# Patient Record
Sex: FEMALE | Race: WHITE
[De-identification: names, ages, dates, MRNs, and addresses within clinical notes are randomized per-mention and may not be internally consistent; named-entity substitution may affect disease eponyms.]

---

## 2020-08-03 ENCOUNTER — HOSPITAL ENCOUNTER (INPATIENT)
Dept: HOSPITAL 75 - LDRP | Age: 25
LOS: 1 days | Discharge: HOME | End: 2020-08-04
Attending: OBSTETRICS & GYNECOLOGY | Admitting: OBSTETRICS & GYNECOLOGY
Payer: MEDICAID

## 2020-08-03 VITALS — DIASTOLIC BLOOD PRESSURE: 68 MMHG | SYSTOLIC BLOOD PRESSURE: 116 MMHG

## 2020-08-03 VITALS — DIASTOLIC BLOOD PRESSURE: 57 MMHG | SYSTOLIC BLOOD PRESSURE: 105 MMHG

## 2020-08-03 VITALS — SYSTOLIC BLOOD PRESSURE: 95 MMHG | DIASTOLIC BLOOD PRESSURE: 50 MMHG

## 2020-08-03 VITALS — DIASTOLIC BLOOD PRESSURE: 68 MMHG | SYSTOLIC BLOOD PRESSURE: 104 MMHG

## 2020-08-03 VITALS — SYSTOLIC BLOOD PRESSURE: 101 MMHG | DIASTOLIC BLOOD PRESSURE: 66 MMHG

## 2020-08-03 VITALS — SYSTOLIC BLOOD PRESSURE: 91 MMHG | DIASTOLIC BLOOD PRESSURE: 54 MMHG

## 2020-08-03 VITALS — DIASTOLIC BLOOD PRESSURE: 74 MMHG | SYSTOLIC BLOOD PRESSURE: 112 MMHG

## 2020-08-03 VITALS — DIASTOLIC BLOOD PRESSURE: 58 MMHG | SYSTOLIC BLOOD PRESSURE: 104 MMHG

## 2020-08-03 VITALS — SYSTOLIC BLOOD PRESSURE: 87 MMHG | DIASTOLIC BLOOD PRESSURE: 51 MMHG

## 2020-08-03 VITALS — SYSTOLIC BLOOD PRESSURE: 114 MMHG | DIASTOLIC BLOOD PRESSURE: 83 MMHG

## 2020-08-03 VITALS — DIASTOLIC BLOOD PRESSURE: 60 MMHG | SYSTOLIC BLOOD PRESSURE: 102 MMHG

## 2020-08-03 VITALS — DIASTOLIC BLOOD PRESSURE: 75 MMHG | SYSTOLIC BLOOD PRESSURE: 111 MMHG

## 2020-08-03 VITALS — DIASTOLIC BLOOD PRESSURE: 55 MMHG | SYSTOLIC BLOOD PRESSURE: 110 MMHG

## 2020-08-03 VITALS — DIASTOLIC BLOOD PRESSURE: 74 MMHG | SYSTOLIC BLOOD PRESSURE: 108 MMHG

## 2020-08-03 VITALS — SYSTOLIC BLOOD PRESSURE: 90 MMHG | DIASTOLIC BLOOD PRESSURE: 52 MMHG

## 2020-08-03 VITALS — SYSTOLIC BLOOD PRESSURE: 102 MMHG | DIASTOLIC BLOOD PRESSURE: 62 MMHG

## 2020-08-03 VITALS — DIASTOLIC BLOOD PRESSURE: 70 MMHG | SYSTOLIC BLOOD PRESSURE: 102 MMHG

## 2020-08-03 VITALS — SYSTOLIC BLOOD PRESSURE: 104 MMHG | DIASTOLIC BLOOD PRESSURE: 63 MMHG

## 2020-08-03 VITALS — HEIGHT: 64.02 IN | BODY MASS INDEX: 36.66 KG/M2 | WEIGHT: 214.73 LBS

## 2020-08-03 VITALS — SYSTOLIC BLOOD PRESSURE: 98 MMHG | DIASTOLIC BLOOD PRESSURE: 61 MMHG

## 2020-08-03 VITALS — DIASTOLIC BLOOD PRESSURE: 75 MMHG | SYSTOLIC BLOOD PRESSURE: 117 MMHG

## 2020-08-03 VITALS — SYSTOLIC BLOOD PRESSURE: 87 MMHG | DIASTOLIC BLOOD PRESSURE: 58 MMHG

## 2020-08-03 VITALS — SYSTOLIC BLOOD PRESSURE: 72 MMHG | DIASTOLIC BLOOD PRESSURE: 44 MMHG

## 2020-08-03 VITALS — DIASTOLIC BLOOD PRESSURE: 56 MMHG | SYSTOLIC BLOOD PRESSURE: 103 MMHG

## 2020-08-03 VITALS — SYSTOLIC BLOOD PRESSURE: 95 MMHG | DIASTOLIC BLOOD PRESSURE: 62 MMHG

## 2020-08-03 VITALS — DIASTOLIC BLOOD PRESSURE: 51 MMHG | SYSTOLIC BLOOD PRESSURE: 84 MMHG

## 2020-08-03 VITALS — DIASTOLIC BLOOD PRESSURE: 54 MMHG | SYSTOLIC BLOOD PRESSURE: 96 MMHG

## 2020-08-03 VITALS — SYSTOLIC BLOOD PRESSURE: 98 MMHG | DIASTOLIC BLOOD PRESSURE: 53 MMHG

## 2020-08-03 VITALS — DIASTOLIC BLOOD PRESSURE: 72 MMHG | SYSTOLIC BLOOD PRESSURE: 109 MMHG

## 2020-08-03 VITALS — SYSTOLIC BLOOD PRESSURE: 85 MMHG | DIASTOLIC BLOOD PRESSURE: 53 MMHG

## 2020-08-03 VITALS — SYSTOLIC BLOOD PRESSURE: 106 MMHG | DIASTOLIC BLOOD PRESSURE: 74 MMHG

## 2020-08-03 VITALS — DIASTOLIC BLOOD PRESSURE: 63 MMHG | SYSTOLIC BLOOD PRESSURE: 104 MMHG

## 2020-08-03 VITALS — DIASTOLIC BLOOD PRESSURE: 74 MMHG | SYSTOLIC BLOOD PRESSURE: 111 MMHG

## 2020-08-03 VITALS — SYSTOLIC BLOOD PRESSURE: 115 MMHG | DIASTOLIC BLOOD PRESSURE: 73 MMHG

## 2020-08-03 VITALS — SYSTOLIC BLOOD PRESSURE: 113 MMHG | DIASTOLIC BLOOD PRESSURE: 59 MMHG

## 2020-08-03 VITALS — DIASTOLIC BLOOD PRESSURE: 71 MMHG | SYSTOLIC BLOOD PRESSURE: 106 MMHG

## 2020-08-03 VITALS — SYSTOLIC BLOOD PRESSURE: 102 MMHG | DIASTOLIC BLOOD PRESSURE: 63 MMHG

## 2020-08-03 VITALS — DIASTOLIC BLOOD PRESSURE: 69 MMHG | SYSTOLIC BLOOD PRESSURE: 130 MMHG

## 2020-08-03 VITALS — SYSTOLIC BLOOD PRESSURE: 99 MMHG | DIASTOLIC BLOOD PRESSURE: 54 MMHG

## 2020-08-03 VITALS — DIASTOLIC BLOOD PRESSURE: 56 MMHG | SYSTOLIC BLOOD PRESSURE: 109 MMHG

## 2020-08-03 VITALS — DIASTOLIC BLOOD PRESSURE: 54 MMHG | SYSTOLIC BLOOD PRESSURE: 87 MMHG

## 2020-08-03 VITALS — SYSTOLIC BLOOD PRESSURE: 114 MMHG | DIASTOLIC BLOOD PRESSURE: 78 MMHG

## 2020-08-03 VITALS — SYSTOLIC BLOOD PRESSURE: 106 MMHG | DIASTOLIC BLOOD PRESSURE: 63 MMHG

## 2020-08-03 VITALS — DIASTOLIC BLOOD PRESSURE: 57 MMHG | SYSTOLIC BLOOD PRESSURE: 96 MMHG

## 2020-08-03 VITALS — DIASTOLIC BLOOD PRESSURE: 64 MMHG | SYSTOLIC BLOOD PRESSURE: 103 MMHG

## 2020-08-03 VITALS — DIASTOLIC BLOOD PRESSURE: 64 MMHG | SYSTOLIC BLOOD PRESSURE: 105 MMHG

## 2020-08-03 VITALS — DIASTOLIC BLOOD PRESSURE: 71 MMHG | SYSTOLIC BLOOD PRESSURE: 110 MMHG

## 2020-08-03 VITALS — DIASTOLIC BLOOD PRESSURE: 50 MMHG | SYSTOLIC BLOOD PRESSURE: 90 MMHG

## 2020-08-03 VITALS — DIASTOLIC BLOOD PRESSURE: 67 MMHG | SYSTOLIC BLOOD PRESSURE: 170 MMHG

## 2020-08-03 VITALS — DIASTOLIC BLOOD PRESSURE: 72 MMHG | SYSTOLIC BLOOD PRESSURE: 108 MMHG

## 2020-08-03 VITALS — SYSTOLIC BLOOD PRESSURE: 110 MMHG | DIASTOLIC BLOOD PRESSURE: 65 MMHG

## 2020-08-03 VITALS — SYSTOLIC BLOOD PRESSURE: 111 MMHG | DIASTOLIC BLOOD PRESSURE: 64 MMHG

## 2020-08-03 VITALS — DIASTOLIC BLOOD PRESSURE: 63 MMHG | SYSTOLIC BLOOD PRESSURE: 144 MMHG

## 2020-08-03 VITALS — SYSTOLIC BLOOD PRESSURE: 99 MMHG | DIASTOLIC BLOOD PRESSURE: 65 MMHG

## 2020-08-03 VITALS — DIASTOLIC BLOOD PRESSURE: 57 MMHG | SYSTOLIC BLOOD PRESSURE: 99 MMHG

## 2020-08-03 VITALS — DIASTOLIC BLOOD PRESSURE: 56 MMHG | SYSTOLIC BLOOD PRESSURE: 102 MMHG

## 2020-08-03 VITALS — DIASTOLIC BLOOD PRESSURE: 75 MMHG | SYSTOLIC BLOOD PRESSURE: 115 MMHG

## 2020-08-03 VITALS — SYSTOLIC BLOOD PRESSURE: 111 MMHG | DIASTOLIC BLOOD PRESSURE: 74 MMHG

## 2020-08-03 VITALS — DIASTOLIC BLOOD PRESSURE: 57 MMHG | SYSTOLIC BLOOD PRESSURE: 101 MMHG

## 2020-08-03 VITALS — SYSTOLIC BLOOD PRESSURE: 90 MMHG | DIASTOLIC BLOOD PRESSURE: 54 MMHG

## 2020-08-03 VITALS — DIASTOLIC BLOOD PRESSURE: 62 MMHG | SYSTOLIC BLOOD PRESSURE: 104 MMHG

## 2020-08-03 VITALS — SYSTOLIC BLOOD PRESSURE: 109 MMHG | DIASTOLIC BLOOD PRESSURE: 69 MMHG

## 2020-08-03 VITALS — DIASTOLIC BLOOD PRESSURE: 51 MMHG | SYSTOLIC BLOOD PRESSURE: 89 MMHG

## 2020-08-03 VITALS — SYSTOLIC BLOOD PRESSURE: 113 MMHG | DIASTOLIC BLOOD PRESSURE: 73 MMHG

## 2020-08-03 VITALS — DIASTOLIC BLOOD PRESSURE: 53 MMHG | SYSTOLIC BLOOD PRESSURE: 110 MMHG

## 2020-08-03 VITALS — SYSTOLIC BLOOD PRESSURE: 108 MMHG | DIASTOLIC BLOOD PRESSURE: 70 MMHG

## 2020-08-03 VITALS — SYSTOLIC BLOOD PRESSURE: 108 MMHG | DIASTOLIC BLOOD PRESSURE: 67 MMHG

## 2020-08-03 VITALS — SYSTOLIC BLOOD PRESSURE: 101 MMHG | DIASTOLIC BLOOD PRESSURE: 71 MMHG

## 2020-08-03 VITALS — DIASTOLIC BLOOD PRESSURE: 66 MMHG | SYSTOLIC BLOOD PRESSURE: 108 MMHG

## 2020-08-03 VITALS — SYSTOLIC BLOOD PRESSURE: 102 MMHG | DIASTOLIC BLOOD PRESSURE: 65 MMHG

## 2020-08-03 DIAGNOSIS — Z3A.39: ICD-10-CM

## 2020-08-03 DIAGNOSIS — Z23: ICD-10-CM

## 2020-08-03 LAB
BASOPHILS # BLD AUTO: 0 10^3/UL (ref 0–0.1)
BASOPHILS NFR BLD AUTO: 0 % (ref 0–10)
EOSINOPHIL # BLD AUTO: 0.2 10^3/UL (ref 0–0.3)
EOSINOPHIL NFR BLD AUTO: 2 % (ref 0–10)
ERYTHROCYTE [DISTWIDTH] IN BLOOD BY AUTOMATED COUNT: 14.9 % (ref 10–14.5)
HCT VFR BLD CALC: 36 % (ref 35–52)
HGB BLD-MCNC: 11.8 G/DL (ref 11.5–16)
LYMPHOCYTES # BLD AUTO: 1.8 X 10^3 (ref 1–4)
LYMPHOCYTES NFR BLD AUTO: 16 % (ref 12–44)
MANUAL DIFFERENTIAL PERFORMED BLD QL: NO
MCH RBC QN AUTO: 26 PG (ref 25–34)
MCHC RBC AUTO-ENTMCNC: 33 G/DL (ref 32–36)
MCV RBC AUTO: 80 FL (ref 80–99)
MONOCYTES # BLD AUTO: 0.9 X 10^3 (ref 0–1)
MONOCYTES NFR BLD AUTO: 8 % (ref 0–12)
NEUTROPHILS # BLD AUTO: 8.4 X 10^3 (ref 1.8–7.8)
NEUTROPHILS NFR BLD AUTO: 74 % (ref 42–75)
PLATELET # BLD: 279 10^3/UL (ref 130–400)
PMV BLD AUTO: 11.5 FL (ref 7.4–10.4)
WBC # BLD AUTO: 11.4 10^3/UL (ref 4.3–11)

## 2020-08-03 PROCEDURE — 90707 MMR VACCINE SC: CPT

## 2020-08-03 PROCEDURE — 85025 COMPLETE CBC W/AUTO DIFF WBC: CPT

## 2020-08-03 PROCEDURE — 90715 TDAP VACCINE 7 YRS/> IM: CPT

## 2020-08-03 PROCEDURE — 36415 COLL VENOUS BLD VENIPUNCTURE: CPT

## 2020-08-03 PROCEDURE — 86901 BLOOD TYPING SEROLOGIC RH(D): CPT

## 2020-08-03 PROCEDURE — 86900 BLOOD TYPING SEROLOGIC ABO: CPT

## 2020-08-03 PROCEDURE — 86850 RBC ANTIBODY SCREEN: CPT

## 2020-08-03 PROCEDURE — 3E033VJ INTRODUCTION OF OTHER HORMONE INTO PERIPHERAL VEIN, PERCUTANEOUS APPROACH: ICD-10-PCS | Performed by: OBSTETRICS & GYNECOLOGY

## 2020-08-03 RX ADMIN — Medication SCH ML: at 22:29

## 2020-08-03 RX ADMIN — SODIUM CHLORIDE, SODIUM LACTATE, POTASSIUM CHLORIDE, CALCIUM CHLORIDE, AND DEXTROSE MONOHYDRATE SCH MLS/HR: 600; 310; 30; 20; 5 INJECTION, SOLUTION INTRAVENOUS at 06:16

## 2020-08-03 RX ADMIN — SODIUM CHLORIDE, SODIUM LACTATE, POTASSIUM CHLORIDE, CALCIUM CHLORIDE, AND DEXTROSE MONOHYDRATE SCH MLS/HR: 600; 310; 30; 20; 5 INJECTION, SOLUTION INTRAVENOUS at 22:29

## 2020-08-03 RX ADMIN — SODIUM CHLORIDE, SODIUM LACTATE, POTASSIUM CHLORIDE, CALCIUM CHLORIDE, AND DEXTROSE MONOHYDRATE SCH MLS/HR: 600; 310; 30; 20; 5 INJECTION, SOLUTION INTRAVENOUS at 13:29

## 2020-08-03 RX ADMIN — Medication SCH ML: at 19:59

## 2020-08-03 RX ADMIN — Medication SCH ML/HR: at 12:05

## 2020-08-03 RX ADMIN — DOCUSATE SODIUM SCH MG: 100 CAPSULE ORAL at 22:29

## 2020-08-03 RX ADMIN — ACETAMINOPHEN SCH MG: 500 TABLET ORAL at 19:45

## 2020-08-03 RX ADMIN — Medication SCH ML/HR: at 10:39

## 2020-08-03 RX ADMIN — IBUPROFEN SCH MG: 800 TABLET, FILM COATED ORAL at 19:45

## 2020-08-03 RX ADMIN — Medication SCH ML: at 10:39

## 2020-08-03 RX ADMIN — Medication SCH ML: at 17:49

## 2020-08-03 RX ADMIN — Medication SCH ML: at 20:01

## 2020-08-03 NOTE — XMS REPORT
Continuity of Care Document

                             Created on: 2020



THOMAS FRANCO

External Reference #: [id number]

: 1995

Sex: Female



Demographics





                          Address                   [address] [place]  [address]

 

                          Home Phone                [phone] x

 

                          Preferred Language        Unknown

 

                          Marital Status            Unknown

 

                          Tenriism Affiliation     Unknown

 

                          Race                      Unknown

 

                          Ethnic Group              Unknown





Author





                          Organization              Unknown

 

                          Address                   Unknown

 

                          Phone                     Unavailable



              



Allergies

      



             Active           Description           Code           Type         

  Severity   

                Reaction           Onset           Reported/Identified          

 

Relationship to Patient                 Clinical Status        

 

           Yes           amoxicillin                       NKMA           N/A   

        

[id number]                           10/27/2018                     

        

      



                  



Medications

      



There is no data.                  



Problems

      



             Date Dx Coded           Attending           Type           Code    

       

Diagnosis                               Diagnosed By        

 

                10/27/2018           Jennifer Garcia           Final           

Z34.90          

                                        Encounter for supervision of normal preg

tom, unspecified, unspecified 

trimester                                        



                  



Procedures

      



                Code            Description           Performed By           Per

lucho On        

 

                                      [address]                                 Urin

e pregnancy test, by 

visual color comparison methods                                               10

/      

 

 

                                      [address]92                                 Offi

ce or other outpatient 

visit for the evaluation and management of an established patient, which 
requires at least 2 of these 3 key components: A problem focused history; A 
problem focused examinat                                               10/27/201

8        



                    



Results

      



                    Test                Result              Range        

 

                                        [facility] - 19 15:55         

 

                    GLUCOSE             84 mg/dL                    

 

                    UREA NITROGEN (BUN)           14 mg/dL            7-25      

  

 

                    CREATININE           0.86 mg/dL           0.50-1.10        

 

                    eGFR NON-AFR. AMERICAN           97 mL/min/1.73m2           

> OR = 60        

 

                    eGFR            112 mL/min/1.73m2           

> OR = 60        

 

                    BUN/CREATININE RATIO           NOT APPLICABLE (calc)        

   6-22        

 

                    SODIUM              138 mmol/L           135-146        

 

                    POTASSIUM           4.0 mmol/L           3.5-5.3        

 

                    CHLORIDE            103 mmol/L                   

 

                    CARBON DIOXIDE           26 mmol/L           20-32        

 

                    CALCIUM             9.8 mg/dL           8.6-10.2        

 

                    PROTEIN, TOTAL           7.3 g/dL            6.1-8.1        

 

                    ALBUMIN             4.5 g/dL            3.6-5.1        

 

                    GLOBULIN            2.8 g/dL (calc)           1.9-3.7       

 

 

                    ALBUMIN/GLOBULIN RATIO           1.6 (calc)           1.0-2.

5        

 

                    BILIRUBIN, TOTAL           0.4 mg/dL           0.2-1.2      

  

 

                    ALKALINE PHOSPHATASE           52 U/L                 

     

 

                    AST                 19 U/L              10-30        

 

                    ALT                 23 U/L              6-29        

 

                                        CBC - 03/26/19 15:55         

 

                    WHITE BLOOD CELL COUNT           12.2 Thousand/uL           

3.8-10.8        

 

                    RED BLOOD CELL COUNT           4.96 Million/uL           3.8

0-5.10        

 

                    HEMOGLOBIN           14.4 g/dL           11.7-15.5        

 

                    HEMATOCRIT           41.6 %              35.0-45.0        

 

                    MCV                 83.9 fL             80.0-100.0        

 

                    MCH                 29.0 pg             27.0-33.0        

 

                    MCHC                34.6 g/dL           32.0-36.0        

 

                    RDW                 12.9 %              11.0-15.0        

 

                    PLATELET COUNT           352 Thousand/uL           140-400  

      

 

                    MPV                 11.3 fL             7.5-12.5        

 

                    ABSOLUTE NEUTROPHILS           7747 cells/uL           1500-

7800        

 

                    ABSOLUTE LYMPHOCYTES           2611 cells/uL           850-3

900        

 

                    ABSOLUTE MONOCYTES           781 cells/uL           200-950 

       

 

                    ABSOLUTE EOSINOPHILS           1013 cells/uL           15-50

0        

 

                    ABSOLUTE BASOPHILS           49 cells/uL           0-200    

    

 

                    NEUTROPHILS           63.5 %              NRG        

 

                    LYMPHOCYTES           21.4 %              NRG        

 

                    MONOCYTES           6.4 %               NRG        

 

                    EOSINOPHILS           8.3 %               NRG        

 

                    BASOPHILS           0.4 %               NRG        

 

                                        RUBELLA IMMUNE STATUS - 19 11:55  

       

 

                    RUBELLA ANTIBODY (IGG)           <0.90 index           NRG  

      

 

                                        CULTURE, GENITAL - 19 11:55       

  

 

                    CULTURE, GENITAL           SEE NOTE            NRG        

 

                                        SUREPATH PAP RFX HPV mRNA E6/E7 -  19:00         

 

                    CLINICAL INFORMATION:                               NRG     

   

 

                    LMP:                                    NRG        

 

                    PREV. PAP:                               NRG        

 

                    PREV. BX:                               NRG        

 

                    SOURCE:             [employer]              NRG        

 

                    STATEMENT OF ADEQUACY:                               NRG    

    

 

                    INTERPRETATION/RESULT:                               NRG    

    

 

                    CYTOTECHNOLOGIST:                               NRG        

 

                    COMMENT                                 NRG        

 

                                        GLUCOSE BERTA 1 HOUR - 20 16:38     

    

 

                    GLUCOSE, POSTPRANDIAL/ 1 HOUR           116 mg/dL           

See Note:        

 

                                        CBC - 20 16:38         

 

                    WHITE BLOOD CELL COUNT           11.1 Thousand/uL           

3.8-10.8        

 

                    RED BLOOD CELL COUNT           4.29 Million/uL           3.8

0-5.10        

 

                    HEMOGLOBIN           12.5 g/dL           11.7-15.5        

 

                    HEMATOCRIT           37.3 %              35.0-45.0        

 

                    MCV                 86.9 fL             80.0-100.0        

 

                    MCH                 29.1 pg             27.0-33.0        

 

                    MCHC                33.5 g/dL           32.0-36.0        

 

                    RDW                 13.0 %              11.0-15.0        

 

                    PLATELET COUNT           309 Thousand/uL           140-400  

      

 

                    MPV                 11.4 fL             7.5-12.5        

 

                    ABSOLUTE NEUTROPHILS           8825 cells/uL           1500-

7800        

 

                    ABSOLUTE LYMPHOCYTES           1476 cells/uL           850-3

900        

 

                    ABSOLUTE MONOCYTES           577 cells/uL           200-950 

       

 

                    ABSOLUTE EOSINOPHILS           200 cells/uL           

        

 

                    ABSOLUTE BASOPHILS           22 cells/uL           0-200    

    

 

                    NEUTROPHILS           79.5 %              NRG        

 

                    LYMPHOCYTES           13.3 %              NRG        

 

                    MONOCYTES           5.2 %               NRG        

 

                    EOSINOPHILS           1.8 %               NRG        

 

                    BASOPHILS           0.2 %               NRG        

 

                                        SYPHILIS (RPR W/ REFLEX CONFIRMATION) - 

20 16:38         

 

                          RPR (DX) W/REFL TITER AND CONFIRMATORY TESTING        

   NON-REACTIVE           

                                        NON-REACTIVE        

 

                                        CULTURE, GROUP B STREP (VAGINAL) -  08:39         

 

                    STREPTOCOCCUS, GROUP B CULTURE           SEE NOTE           

 NRG        



                                



Encounters

      



                ACCT No.           Visit Date/Time           Discharge          

 Status         

             Pt. Type           Provider           Facility           Loc./Unit 

          

Complaint        

 

                    [id number]           10/27/2018 10:01:00           10/27/2

018 23:59:00        

                DIS             Outpatient           Jennifer Garcia           

[facility]                    [facility]                ICM PREG TEST NPV        

 

             [id number]           2020 09:15:00                        [facility]    

       

Outpatient           PATRICIA ARRIAGA LAC                               [facility]                                             

 

             [id number]           2020 10:30:00                              

       Document

Registration                                                                    

 

             [id number]           2020 15:30:00                              

       Document

Registration                                                                    

 

             [id number]           2019 10:40:00                              

       Document

Registration                                                                    

 

             [id number]           2019 15:20:00                              

       Document

Registration

## 2020-08-03 NOTE — NUR
THOMAS FRANCO presented to unit via ambulation from home/ED, accompanied by SO, with for 
INDUCTION. THOMAS FRANCO weighed, gowned, voided, and to bed.  EFHM and TOCO applied, VS 
taken.  THOMAS FRANCO oriented to bed controls, call light, TV, heat, and A/C controls.

## 2020-08-03 NOTE — HISTORY & PHYSICAL-OB/GYN
History of Present Illness


History of Present Illness


Reason for visit/HPI


Ms. Gongora, A1 at 39 weeks, presents to Labor & Delivery for Pitocin 

Induction of Labor


Date of Admission


Aug 3, 2020 at 05:30


Date Seen by a Provider:  Aug 3, 2020


Time Seen by a Provider:  07:05


I consulted on this patient on


8/3/20


 07:29


Attending Physician


Sukhwinder Wise DO


Admitting Physician


Sukhwinder Wise DO


Consult








Allergies and Home Medications


Allergies


Coded Allergies:  


     amoxicillin (Verified  Allergy, Unknown, Hives, 8/3/20)





Home Medications


Prenatal Vit No.124/Iron/FA 1 Each Tablet, 1 EACH PO DAILY, (Reported)





Patient Home Medication List


Home Medication List Reviewed:  Yes





Past Medical-Social-Family Hx


Patient Social History


Marrital Status:  single


Number of Children:  0


Number of living children:  0


Alcohol Use:  Denies Use


Recreational Drug Use:  No


Smoking Status:  Never a Smoker


Physical Abuse Screen:  No


Sexual Abuse:  No


Recent Foreign Travel:  No


Contact w/other who traveled:  No


Recent Hopitalizations:  No


Recent Infectious Disease Expo:  No





Seasonal Allergies


Seasonal Allergies:  Yes





Surgeries


No





Respiratory


No





Cardiovascular


No





Neurological


No





Reproductive System


Expected Date of Delivery:  Aug 10, 2020


Hx :  2


Hx Para:  0


Hx Total # of Abortions (Spona:  1


Hx Reproductive Disorders:  No


Sexually Transmitted Disease:  No


HIV/AIDS:  No





Genitourinary


No





Gastrointestinal


No





Musculoskeletal


No





Endocrine


History of Endocrine Disorders:  No





HEENT


History of HEENT Disorders:  No





Cancer


No





Psychosocial


History of Psychiatric Problem:  No





Integumentary


History of Skin or Integumenta:  No





Blood Transfusions


History of Blood Disorders:  No





Review of Systems


Constitutional:  see HPI





Physical Exam


Physical Exam


Vital Signs





Vital Signs








  Date Time  Temp Pulse Resp B/P (MAP) Pulse Ox O2 Delivery O2 Flow Rate FiO2


 


8/3/20 05:45 36.6 101 18  97 Room Air  





Capillary Refill : Less Than 3 Seconds


Labs


Laboratory Tests


8/3/20 05:50: 


White Blood Count 11.4H, Red Blood Count 4.50, Hemoglobin 11.8, Hematocrit 36, 

Mean Corpuscular Volume 80, Mean Corpuscular Hemoglobin 26, Mean Corpuscular 

Hemoglobin Concent 33, Red Cell Distribution Width 14.9H, Platelet Count 279, 

Mean Platelet Volume 11.5H, Neutrophils (%) (Auto) 74, Lymphocytes (%) (Auto) 

16, Monocytes (%) (Auto) 8, Eosinophils (%) (Auto) 2, Basophils (%) (Auto) 0, 

Neutrophils # (Auto) 8.4H, Lymphocytes # (Auto) 1.8, Monocytes # (Auto) 0.9, 

Eosinophils # (Auto) 0.2, Basophils # (Auto) 0.0





General Appearance:  No Apparent Distress, WD/WN


Respiratory:  Chest Non Tender, Lungs Clear, Normal Breath Sounds


Cardiovascular:  Regular Rate, Rhythm, No Murmur


Abdominal:  normal bowel sounds, non tender


Labia:  WNL


Vagina:  WNL


Cervix:  WNL


Cervix OS:  open (1 cm/30%/-3 Vertex/Intact)


Extremity:  Normal Inspection, Non Tender, No Calf Tenderness





Assessment/Plan


Assessment and Plan


Assessment:  Intrauterine Pregnancy at 39 weeks  





Plan:  Pitocin Induction of Labor.  AROM.  Epidural Anesthesia.  Expect a 

vaginal delivery.





Admission Diagnosis


Admission Status:  Inpatient Order (span 2 midnights)


Reason for Inpatient Admission:  


Intrauterine Pregnancy at 39 weeks





Clinical Quality Measures


DVT/VTE Risk/Contraindication:


Risk Factor Score Per Nursin


RFS Level Per Nursing on Admit:  1=Low/No VTE PPX











SUKHWINDER WISE DO                Aug 3, 2020 07:35

## 2020-08-03 NOTE — NUR
1007 Dr. Downs here for epidural placement. Procedure explained, consent reviewed and 
signed by anesthesia.  Questions answered to patient's satisfaction. Time out taken to 
verify correct patient/procedure. 1012 Patient up to side of bed, assisted into sitting 
position.  1014 Betadine prep done x3 and sterile drape applied.  1017, 1023 Local done, see 
anesthesia record.  1030 Test dose given, see anesthesia record for drug and dosage.  
Epidural catheter secured in place.  Epidural placement complete.  1034 Assisted back into 
bed, monitors adjusted.  Epidural dosed, see anesthesia record.  Epidural infusing @12cc/hr 
stated per pump, see EMAR for further.  Patient tolerated procedure well.

## 2020-08-04 VITALS — SYSTOLIC BLOOD PRESSURE: 88 MMHG | DIASTOLIC BLOOD PRESSURE: 51 MMHG

## 2020-08-04 VITALS — DIASTOLIC BLOOD PRESSURE: 63 MMHG | SYSTOLIC BLOOD PRESSURE: 103 MMHG

## 2020-08-04 VITALS — SYSTOLIC BLOOD PRESSURE: 105 MMHG | DIASTOLIC BLOOD PRESSURE: 57 MMHG

## 2020-08-04 VITALS — DIASTOLIC BLOOD PRESSURE: 56 MMHG | SYSTOLIC BLOOD PRESSURE: 99 MMHG

## 2020-08-04 VITALS — SYSTOLIC BLOOD PRESSURE: 101 MMHG | DIASTOLIC BLOOD PRESSURE: 61 MMHG

## 2020-08-04 LAB
BASOPHILS # BLD AUTO: 0 10^3/UL (ref 0–0.1)
BASOPHILS NFR BLD AUTO: 0 % (ref 0–10)
EOSINOPHIL # BLD AUTO: 0.2 10^3/UL (ref 0–0.3)
EOSINOPHIL NFR BLD AUTO: 1 % (ref 0–10)
ERYTHROCYTE [DISTWIDTH] IN BLOOD BY AUTOMATED COUNT: 14.7 % (ref 10–14.5)
HCT VFR BLD CALC: 33 % (ref 35–52)
HGB BLD-MCNC: 10.5 G/DL (ref 11.5–16)
LYMPHOCYTES # BLD AUTO: 2.1 X 10^3 (ref 1–4)
LYMPHOCYTES NFR BLD AUTO: 15 % (ref 12–44)
MANUAL DIFFERENTIAL PERFORMED BLD QL: NO
MCH RBC QN AUTO: 26 PG (ref 25–34)
MCHC RBC AUTO-ENTMCNC: 32 G/DL (ref 32–36)
MCV RBC AUTO: 81 FL (ref 80–99)
MONOCYTES # BLD AUTO: 1.2 X 10^3 (ref 0–1)
MONOCYTES NFR BLD AUTO: 9 % (ref 0–12)
NEUTROPHILS # BLD AUTO: 10.7 X 10^3 (ref 1.8–7.8)
NEUTROPHILS NFR BLD AUTO: 75 % (ref 42–75)
PLATELET # BLD: 232 10^3/UL (ref 130–400)
PMV BLD AUTO: 11.7 FL (ref 7.4–10.4)
WBC # BLD AUTO: 14.2 10^3/UL (ref 4.3–11)

## 2020-08-04 RX ADMIN — ACETAMINOPHEN SCH MG: 500 TABLET ORAL at 09:36

## 2020-08-04 RX ADMIN — DOCUSATE SODIUM SCH MG: 100 CAPSULE ORAL at 09:33

## 2020-08-04 RX ADMIN — ACETAMINOPHEN SCH MG: 500 TABLET ORAL at 16:02

## 2020-08-04 RX ADMIN — IBUPROFEN SCH MG: 800 TABLET, FILM COATED ORAL at 11:50

## 2020-08-04 RX ADMIN — ACETAMINOPHEN SCH MG: 500 TABLET ORAL at 03:38

## 2020-08-04 RX ADMIN — IBUPROFEN SCH MG: 800 TABLET, FILM COATED ORAL at 03:38

## 2020-08-04 NOTE — ANESTHESIA-REGIONAL POST-OP
Regional


Patient Condition


Mental Status:  Alert, Oriented x3


Circulation:  Same as Pre-Op


Headache:  Absent


Sensation:  Full Recovery


Motor Block:  Absent





Post Op Complications


Complications


None





Follow Up Care/Instructions


Patient Instructions


None needed.





Anesthesia/Patient Condition


Patient is doing well, no complaints, stable vital signs, no apparent adverse 

anesthesia problems.   


No complications reported per nursing.











PHYLLIS BUITRAGO CRNA               Aug 4, 2020 12:08

## 2020-08-04 NOTE — DISCHARGE SUMMARY
Diagnosis/Chief Complaint


Date of Admission


Aug 3, 2020 at 05:30


Date of Discharge


2020


Discharge Date:  Aug 4, 2020


Discharge Time:  19:00


Admission Diagnosis


Admission Diagnosis


Intrauterine Pregnancy at 39 weeks





Discharge Diagnosis


Intrauterine Pregnancy at 39 weeks--delivered





Reason Hospital Visit


Ms. Gongora, A1 at 39 weeks, presents to Labor & Delivery for Pitocin 

Induction of Labor





Discharge Summary


Hospital Course


Was the Problem List Reviewed?:  Yes


Hospital Course


Ms. Gongora, A1 at 39 weeks, presented to Labor & Delivery for Pitocin 

Induction of Labor.  I artificially ruptured her membranes.  She received an 

epidural for antepartum pain management.  She progressed to complete and 

delivered a healthy viable female .  The remainder of her hospitalization

was unremarkable.  Her vital signs remained stable throughout her 

hospitalization.  I will discharge her to home with instructions, prescriptions,

and a follow up appointment.


Labs


Laboratory Tests


8/3/20 05:50: 


White Blood Count 11.4H, Red Cell Distribution Width 14.9H, Mean Platelet Volume

11.5H, Neutrophils # (Auto) 8.4H


20 05:38: 


White Blood Count 14.2H, Red Cell Distribution Width 14.7H, Mean Platelet Volume

11.7H, Neutrophils # (Auto) 10.7H, Red Blood Count 4.01L, Hemoglobin 10.5L, 

Hematocrit 33L, Monocytes # (Auto) 1.2H





Procedures


None.





Discharge Physical Examination


Allergies:  


Coded Allergies:  


     amoxicillin (Verified  Allergy, Unknown, Hives, 8/3/20)


Vitals & I&Os





Vital Signs








  Date Time  Temp Pulse Resp B/P (MAP) Pulse Ox O2 Delivery O2 Flow Rate FiO2


 


20 03:38 36.8 80 18 103/63 (76) 98 Room Air  








General Appearance:  Alert, Oriented X3, Cooperative


HEENT:  Atraumatic


Respiratory:  Clear to Auscultation, Normal Air Movement


Cardiovascular:  Regular Rate, No Murmurs


Abdominal:  Normal Bowel Sounds, Soft, No Tenderness


Extremities:  No Clubbing, No Cyanosis


Skin:  No Rashes


Neuro:  Normal Gait, Normal Speech


Psych/Mental Status:  Mental Status NL





Discharge


Home Medications


Reviewed and agree with Discharge Medication list on patient's Discharge 

Instruction sheet


Instructions to Patient/Family


Please see electronic discharge instructions given to patient.





Clinical Quality Measures


DVT/VTE Risk/Contraindication:


Risk Factor Score Per Nursin


RFS Level Per Nursing on Admit:  1=Low/No VTE PPX











TOR BOATENG DO                Aug 4, 2020 07:46

## 2020-08-04 NOTE — NUR
DISCHARGE INSTRUCTIONS REVIEWED WITH COPY TO PT. RXS GIVEN EARLIER AND GRANDMOTHER TO GET 
FILLED. STATES UNDERSTANDING OF ALL INSTRUCTIONS AND NEED TO F/U AS SCHEDULED AND AS NEEDED. 
DISMISSED FROM WS IN STABLE CONDITION. PT WILL REMAIN IN ROOM AS A ROOMING IN PARENT R/T 
INFANT NOT BEING DISCHARGED.